# Patient Record
Sex: MALE | Race: WHITE | NOT HISPANIC OR LATINO | Employment: OTHER | URBAN - METROPOLITAN AREA
[De-identification: names, ages, dates, MRNs, and addresses within clinical notes are randomized per-mention and may not be internally consistent; named-entity substitution may affect disease eponyms.]

---

## 2022-04-07 ENCOUNTER — TELEPHONE (OUTPATIENT)
Dept: GASTROENTEROLOGY | Facility: CLINIC | Age: 52
End: 2022-04-07

## 2022-04-07 NOTE — TELEPHONE ENCOUNTER
Pt left  mssg asking to refill (sounds like) 1 mg folic acid once a day  # [phone]  Script info not avail to refill

## 2022-04-07 NOTE — TELEPHONE ENCOUNTER
Obtained more records from [facility]  Appears Dr Lieutenant Belle saw this pt last there on 3/9 and pt was discharged 3/11  He is to follow up with Dr Marva Justice, Dr Kate Wynn and his PCP  Called pt back and advise he call his PCP for his medication refill and he is agreeable